# Patient Record
Sex: MALE | Race: WHITE | URBAN - METROPOLITAN AREA
[De-identification: names, ages, dates, MRNs, and addresses within clinical notes are randomized per-mention and may not be internally consistent; named-entity substitution may affect disease eponyms.]

---

## 2017-05-04 ENCOUNTER — TRANSFERRED RECORDS (OUTPATIENT)
Dept: HEALTH INFORMATION MANAGEMENT | Facility: CLINIC | Age: 62
End: 2017-05-04

## 2017-05-30 ENCOUNTER — TRANSFERRED RECORDS (OUTPATIENT)
Dept: HEALTH INFORMATION MANAGEMENT | Facility: CLINIC | Age: 62
End: 2017-05-30

## 2017-05-30 ENCOUNTER — HOSPITAL ENCOUNTER (OUTPATIENT)
Dept: GENERAL RADIOLOGY | Facility: CLINIC | Age: 62
Discharge: HOME | End: 2017-05-30
Attending: OTOLARYNGOLOGY | Admitting: OTOLARYNGOLOGY
Payer: COMMERCIAL

## 2017-05-30 DIAGNOSIS — R42 DIZZINESS: ICD-10-CM

## 2017-05-30 PROCEDURE — 70120 X-RAY EXAM OF MASTOIDS: CPT

## 2017-07-05 RX ORDER — OMEGA-3 FATTY ACIDS/FISH OIL 300-1000MG
1 CAPSULE ORAL EVERY MORNING
COMMUNITY

## 2017-07-05 RX ORDER — NEOMYCIN SULFATE, POLYMYXIN B SULFATE, HYDROCORTISONE 3.5; 10000; 1 MG/ML; [USP'U]/ML; MG/ML
4 SOLUTION/ DROPS AURICULAR (OTIC) 4 TIMES DAILY
Status: ON HOLD | COMMUNITY
End: 2017-07-10

## 2017-07-09 ENCOUNTER — ANESTHESIA EVENT (OUTPATIENT)
Dept: SURGERY | Facility: CLINIC | Age: 62
End: 2017-07-09
Payer: COMMERCIAL

## 2017-07-10 ENCOUNTER — ANESTHESIA (OUTPATIENT)
Dept: SURGERY | Facility: CLINIC | Age: 62
End: 2017-07-10
Payer: COMMERCIAL

## 2017-07-10 ENCOUNTER — HOSPITAL ENCOUNTER (OUTPATIENT)
Facility: CLINIC | Age: 62
Discharge: HOME | End: 2017-07-10
Attending: OTOLARYNGOLOGY | Admitting: OTOLARYNGOLOGY
Payer: COMMERCIAL

## 2017-07-10 VITALS
RESPIRATION RATE: 12 BRPM | WEIGHT: 222 LBS | HEIGHT: 75 IN | DIASTOLIC BLOOD PRESSURE: 69 MMHG | HEART RATE: 54 BPM | SYSTOLIC BLOOD PRESSURE: 129 MMHG | BODY MASS INDEX: 27.6 KG/M2 | OXYGEN SATURATION: 94 % | TEMPERATURE: 97.9 F

## 2017-07-10 DIAGNOSIS — Z98.890 S/P EAR SURGERY: Primary | ICD-10-CM

## 2017-07-10 LAB — POTASSIUM SERPL-SCNC: 3.1 MMOL/L (ref 3.4–5.3)

## 2017-07-10 PROCEDURE — 84132 ASSAY OF SERUM POTASSIUM: CPT | Performed by: ANESTHESIOLOGY

## 2017-07-10 PROCEDURE — 37000008 ZZH ANESTHESIA TECHNICAL FEE, 1ST 30 MIN: Performed by: OTOLARYNGOLOGY

## 2017-07-10 PROCEDURE — 36000062 ZZH SURGERY LEVEL 4 1ST 30 MIN - UMMC: Performed by: OTOLARYNGOLOGY

## 2017-07-10 PROCEDURE — 25000128 H RX IP 250 OP 636: Performed by: REGISTERED NURSE

## 2017-07-10 PROCEDURE — 27210995 ZZH RX 272: Performed by: OTOLARYNGOLOGY

## 2017-07-10 PROCEDURE — 25000128 H RX IP 250 OP 636: Performed by: ANESTHESIOLOGY

## 2017-07-10 PROCEDURE — 25000566 ZZH SEVOFLURANE, EA 15 MIN: Performed by: OTOLARYNGOLOGY

## 2017-07-10 PROCEDURE — 25000125 ZZHC RX 250: Performed by: OTOLARYNGOLOGY

## 2017-07-10 PROCEDURE — 25000128 H RX IP 250 OP 636: Performed by: OTOLARYNGOLOGY

## 2017-07-10 PROCEDURE — 40000170 ZZH STATISTIC PRE-PROCEDURE ASSESSMENT II: Performed by: OTOLARYNGOLOGY

## 2017-07-10 PROCEDURE — 36415 COLL VENOUS BLD VENIPUNCTURE: CPT | Performed by: ANESTHESIOLOGY

## 2017-07-10 PROCEDURE — 25000132 ZZH RX MED GY IP 250 OP 250 PS 637: Performed by: OTOLARYNGOLOGY

## 2017-07-10 PROCEDURE — 25000125 ZZHC RX 250: Performed by: ANESTHESIOLOGY

## 2017-07-10 PROCEDURE — C9399 UNCLASSIFIED DRUGS OR BIOLOG: HCPCS | Performed by: REGISTERED NURSE

## 2017-07-10 PROCEDURE — 27211024 ZZHC OR SUPPLY OTHER OPNP: Performed by: OTOLARYNGOLOGY

## 2017-07-10 PROCEDURE — 36000064 ZZH SURGERY LEVEL 4 EA 15 ADDTL MIN - UMMC: Performed by: OTOLARYNGOLOGY

## 2017-07-10 PROCEDURE — 25000125 ZZHC RX 250: Performed by: REGISTERED NURSE

## 2017-07-10 PROCEDURE — 71000027 ZZH RECOVERY PHASE 2 EACH 15 MINS: Performed by: OTOLARYNGOLOGY

## 2017-07-10 PROCEDURE — 71000014 ZZH RECOVERY PHASE 1 LEVEL 2 FIRST HR: Performed by: OTOLARYNGOLOGY

## 2017-07-10 PROCEDURE — 37000009 ZZH ANESTHESIA TECHNICAL FEE, EACH ADDTL 15 MIN: Performed by: OTOLARYNGOLOGY

## 2017-07-10 PROCEDURE — 27210794 ZZH OR GENERAL SUPPLY STERILE: Performed by: OTOLARYNGOLOGY

## 2017-07-10 DEVICE — SHEET SILICONE 38MMX51MMX0.13MM  20-10685: Type: IMPLANTABLE DEVICE | Site: EAR | Status: FUNCTIONAL

## 2017-07-10 RX ORDER — ALBUTEROL SULFATE 0.83 MG/ML
2.5 SOLUTION RESPIRATORY (INHALATION) ONCE
Status: COMPLETED | OUTPATIENT
Start: 2017-07-10 | End: 2017-07-10

## 2017-07-10 RX ORDER — OXYCODONE AND ACETAMINOPHEN 5; 325 MG/1; MG/1
1 TABLET ORAL EVERY 4 HOURS PRN
Qty: 40 TABLET | Refills: 0 | Status: SHIPPED | OUTPATIENT
Start: 2017-07-10

## 2017-07-10 RX ORDER — SODIUM CHLORIDE, SODIUM LACTATE, POTASSIUM CHLORIDE, AND CALCIUM CHLORIDE .6; .31; .03; .02 G/100ML; G/100ML; G/100ML; G/100ML
IRRIGANT IRRIGATION PRN
Status: DISCONTINUED | OUTPATIENT
Start: 2017-07-10 | End: 2017-07-10 | Stop reason: HOSPADM

## 2017-07-10 RX ORDER — ONDANSETRON 2 MG/ML
4 INJECTION INTRAMUSCULAR; INTRAVENOUS EVERY 30 MIN PRN
Status: DISCONTINUED | OUTPATIENT
Start: 2017-07-10 | End: 2017-07-10 | Stop reason: HOSPADM

## 2017-07-10 RX ORDER — ONDANSETRON 2 MG/ML
INJECTION INTRAMUSCULAR; INTRAVENOUS PRN
Status: DISCONTINUED | OUTPATIENT
Start: 2017-07-10 | End: 2017-07-10

## 2017-07-10 RX ORDER — SODIUM CHLORIDE, SODIUM LACTATE, POTASSIUM CHLORIDE, CALCIUM CHLORIDE 600; 310; 30; 20 MG/100ML; MG/100ML; MG/100ML; MG/100ML
INJECTION, SOLUTION INTRAVENOUS CONTINUOUS
Status: DISCONTINUED | OUTPATIENT
Start: 2017-07-10 | End: 2017-07-10 | Stop reason: HOSPADM

## 2017-07-10 RX ORDER — EPHEDRINE SULFATE 50 MG/ML
INJECTION, SOLUTION INTRAMUSCULAR; INTRAVENOUS; SUBCUTANEOUS PRN
Status: DISCONTINUED | OUTPATIENT
Start: 2017-07-10 | End: 2017-07-10

## 2017-07-10 RX ORDER — CEFAZOLIN SODIUM 1 G/3ML
1 INJECTION, POWDER, FOR SOLUTION INTRAMUSCULAR; INTRAVENOUS SEE ADMIN INSTRUCTIONS
Status: DISCONTINUED | OUTPATIENT
Start: 2017-07-10 | End: 2017-07-10 | Stop reason: HOSPADM

## 2017-07-10 RX ORDER — MEPERIDINE HYDROCHLORIDE 25 MG/ML
12.5 INJECTION INTRAMUSCULAR; INTRAVENOUS; SUBCUTANEOUS
Status: DISCONTINUED | OUTPATIENT
Start: 2017-07-10 | End: 2017-07-10 | Stop reason: HOSPADM

## 2017-07-10 RX ORDER — LIDOCAINE HYDROCHLORIDE AND EPINEPHRINE 10; 10 MG/ML; UG/ML
INJECTION, SOLUTION INFILTRATION; PERINEURAL PRN
Status: DISCONTINUED | OUTPATIENT
Start: 2017-07-10 | End: 2017-07-10 | Stop reason: HOSPADM

## 2017-07-10 RX ORDER — FENTANYL CITRATE 50 UG/ML
25-50 INJECTION, SOLUTION INTRAMUSCULAR; INTRAVENOUS
Status: DISCONTINUED | OUTPATIENT
Start: 2017-07-10 | End: 2017-07-10 | Stop reason: HOSPADM

## 2017-07-10 RX ORDER — OFLOXACIN 3 MG/ML
SOLUTION/ DROPS OPHTHALMIC PRN
Status: DISCONTINUED | OUTPATIENT
Start: 2017-07-10 | End: 2017-07-10 | Stop reason: HOSPADM

## 2017-07-10 RX ORDER — AMOXICILLIN 500 MG/1
500 CAPSULE ORAL 3 TIMES DAILY
Qty: 30 CAPSULE | Refills: 0 | Status: SHIPPED | OUTPATIENT
Start: 2017-07-10

## 2017-07-10 RX ORDER — CEFAZOLIN SODIUM 2 G/100ML
2 INJECTION, SOLUTION INTRAVENOUS
Status: COMPLETED | OUTPATIENT
Start: 2017-07-10 | End: 2017-07-10

## 2017-07-10 RX ORDER — DEXAMETHASONE SODIUM PHOSPHATE 4 MG/ML
INJECTION, SOLUTION INTRA-ARTICULAR; INTRALESIONAL; INTRAMUSCULAR; INTRAVENOUS; SOFT TISSUE PRN
Status: DISCONTINUED | OUTPATIENT
Start: 2017-07-10 | End: 2017-07-10

## 2017-07-10 RX ORDER — PROPOFOL 10 MG/ML
INJECTION, EMULSION INTRAVENOUS PRN
Status: DISCONTINUED | OUTPATIENT
Start: 2017-07-10 | End: 2017-07-10

## 2017-07-10 RX ORDER — LIDOCAINE HYDROCHLORIDE 20 MG/ML
INJECTION, SOLUTION INFILTRATION; PERINEURAL PRN
Status: DISCONTINUED | OUTPATIENT
Start: 2017-07-10 | End: 2017-07-10

## 2017-07-10 RX ORDER — EPINEPHRINE NASAL SOLUTION 1 MG/ML
SOLUTION NASAL PRN
Status: DISCONTINUED | OUTPATIENT
Start: 2017-07-10 | End: 2017-07-10 | Stop reason: HOSPADM

## 2017-07-10 RX ORDER — OXYCODONE AND ACETAMINOPHEN 5; 325 MG/1; MG/1
1 TABLET ORAL ONCE
Status: COMPLETED | OUTPATIENT
Start: 2017-07-10 | End: 2017-07-10

## 2017-07-10 RX ORDER — ONDANSETRON 4 MG/1
4 TABLET, ORALLY DISINTEGRATING ORAL EVERY 30 MIN PRN
Status: DISCONTINUED | OUTPATIENT
Start: 2017-07-10 | End: 2017-07-10 | Stop reason: HOSPADM

## 2017-07-10 RX ORDER — NALOXONE HYDROCHLORIDE 0.4 MG/ML
.1-.4 INJECTION, SOLUTION INTRAMUSCULAR; INTRAVENOUS; SUBCUTANEOUS
Status: DISCONTINUED | OUTPATIENT
Start: 2017-07-10 | End: 2017-07-10 | Stop reason: HOSPADM

## 2017-07-10 RX ORDER — FENTANYL CITRATE 50 UG/ML
INJECTION, SOLUTION INTRAMUSCULAR; INTRAVENOUS PRN
Status: DISCONTINUED | OUTPATIENT
Start: 2017-07-10 | End: 2017-07-10

## 2017-07-10 RX ADMIN — CEFAZOLIN SODIUM 2 G: 2 INJECTION, SOLUTION INTRAVENOUS at 07:52

## 2017-07-10 RX ADMIN — FENTANYL CITRATE 100 MCG: 50 INJECTION, SOLUTION INTRAMUSCULAR; INTRAVENOUS at 09:19

## 2017-07-10 RX ADMIN — PROPOFOL 200 MG: 10 INJECTION, EMULSION INTRAVENOUS at 07:42

## 2017-07-10 RX ADMIN — FENTANYL CITRATE 50 MCG: 50 INJECTION, SOLUTION INTRAMUSCULAR; INTRAVENOUS at 09:08

## 2017-07-10 RX ADMIN — SUGAMMADEX 200 MG: 100 INJECTION, SOLUTION INTRAVENOUS at 09:24

## 2017-07-10 RX ADMIN — SODIUM CHLORIDE, POTASSIUM CHLORIDE, SODIUM LACTATE AND CALCIUM CHLORIDE: 600; 310; 30; 20 INJECTION, SOLUTION INTRAVENOUS at 07:37

## 2017-07-10 RX ADMIN — FENTANYL CITRATE 50 MCG: 50 INJECTION, SOLUTION INTRAMUSCULAR; INTRAVENOUS at 08:24

## 2017-07-10 RX ADMIN — Medication 10 MG: at 08:24

## 2017-07-10 RX ADMIN — Medication 5 MG: at 08:15

## 2017-07-10 RX ADMIN — FENTANYL CITRATE 50 MCG: 50 INJECTION, SOLUTION INTRAMUSCULAR; INTRAVENOUS at 07:41

## 2017-07-10 RX ADMIN — Medication 40 MG: at 07:44

## 2017-07-10 RX ADMIN — DEXAMETHASONE SODIUM PHOSPHATE 6 MG: 4 INJECTION, SOLUTION INTRAMUSCULAR; INTRAVENOUS at 08:05

## 2017-07-10 RX ADMIN — ONDANSETRON 4 MG: 2 INJECTION INTRAMUSCULAR; INTRAVENOUS at 09:19

## 2017-07-10 RX ADMIN — MIDAZOLAM HYDROCHLORIDE 2 MG: 1 INJECTION, SOLUTION INTRAMUSCULAR; INTRAVENOUS at 07:34

## 2017-07-10 RX ADMIN — LIDOCAINE HYDROCHLORIDE 60 MG: 20 INJECTION, SOLUTION INFILTRATION; PERINEURAL at 07:42

## 2017-07-10 RX ADMIN — OXYCODONE HYDROCHLORIDE AND ACETAMINOPHEN 1 TABLET: 5; 325 TABLET ORAL at 12:00

## 2017-07-10 RX ADMIN — ALBUTEROL SULFATE 2.5 MG: 2.5 SOLUTION RESPIRATORY (INHALATION) at 12:16

## 2017-07-10 RX ADMIN — Medication 5 MG: at 08:35

## 2017-07-10 ASSESSMENT — LIFESTYLE VARIABLES: TOBACCO_USE: 1

## 2017-07-10 NOTE — OR NURSING
Pt still desaturating to the high 80's on and off.  The patient's sats come up to the 90's spontaneously.  He is doing deep breathing and Incentive Spirometer.  LS are clear bilaterally.  The patient was switched from the finger probe to the ear probe and sats remain unchanged.  The patient was positioned upright in the chair to see if that helps.  The patient states that he has had this issue in the past following a colonoscopy.  Will reassess the patient after sitting up for a few minutes to see if his sats improved.

## 2017-07-10 NOTE — IP AVS SNAPSHOT
MRN:[id number]                      After Visit Summary   7/10/2017    Omar Landon    MRN: [id number]           Thank you!     Thank you for choosing [facility] for your care. Our goal is always to provide you with excellent care. Hearing back from our patients is one way we can continue to improve our services. Please take a few minutes to complete the written survey that you may receive in the mail after you visit with us. Thank you!        Patient Information     Date Of Birth          1955        About your hospital stay     You were admitted on:  July 10, 2017 You last received care in the:   PACU    You were discharged on:  July 10, 2017       Who to Call     For medical emergencies, please call 911.  For non-urgent questions about your medical care, please call your primary care provider or clinic, [phone]  For questions related to your surgery, please call your surgery clinic        Attending Provider     Provider Renato Dugan MD Otolaryngology       Primary Care Provider Office Phone # Fax #    Cristiane Simpson [phone]      Further instructions from your care team       Remove dressing in 24 hours  Avoid heavy lifting, straining  Call with fever, headache, neck stiffness, confusion or go to ER         INFORMATION FOR PATIENTS WHO HAVE HAD EAR SURGERY  DO NOT ALLOW WATER OR MOISTURE IN OR AROUND THE EAR CANAL    OR INCISION  DO NOT WASH YOUR HAIR UNTIL AFTER YOUR FIRST VISIT FOLLOWING SURGERY unless your doctor gives you approval. Precautions must be taken to avoid any kind of water or moisture in your ear incision.   DO NOT BLOW YOUR NOSE. Avoid sneezing, if unavoidable, sneeze with your  mouth open.  SLEEP WITH YOUR HEAD PROPPED UP on two pillows for the first few nights after surgery or until the sutures are removed. This will help reduce swelling and promote drainage. Good sleep also promotes rapid healing.  Firm pillows give the  best comfort for sleeping. Some find non-rocking, overstuffed chairs provide comfort. As long as your head is above your feet, a comfortable chair can be used for sleeping.  ACTIVITY:     DO NOT LIFT ANYTHING heavier than 5-10 pounds for 1 week.     Then you may increase on a weekly basis. Example: 2nd week - 10-20 pounds; 3rd week - 20-30 pounds; 4th week - usual normal activity. STOP physical exercises such as aerobics, push-ups, sit-ups, etc, depending on the nature of the surgery performed.      Usually normal activity can be resumed after 1 month.   DO NOT STRAIN. If constipation is a problem, use a laxative.   AVOID QUICK HEAD AND BODY MOVEMENTS, THEY CAN CAUSE DIZZINESS. Some imbalance after surgery is normal, however spinning dizziness (vertigo) should be reported.   IF THE EAR IS PACKED, DO NOT REMOVE THE PACKING   CHANGE THE DRESSING OVER THE INCISION at least twice a day. Make sure you have CLEAN, DRY HANDS when changing the dressing (you will be given supplies including dressings and tape).     Use a cotton-tipped applicator to clean the incision, first with hydrogen peroxide followed by 70% alcohol solution.     Apply a thin layer of Bacitracin ointment twice daily.     Cover with the appropriate dressing. A nurse or physician will advise you on the proper dressing.   LOOK FOR SIGNS OF INFECTION if you have an incision, inspect it daily. Report increased pain, redness, swelling, or drainage to your doctor.   SOME DRAINAGE FROM YOUR EAR IS NORMAL AFTER SURGERY.  You will probably hear unusual sounds until total hearing is complete, generally 4-6 weeks after surgery.    AIRPLANE TRAVEL IS USUALLY RESTRICTED FOR 1 MONTH. Discuss  flying plans with your doctor to discuss measures to protect your ears.    CALL YOUR DOCTOR IF:     Temperature rises to 101 degrees or greater     Ear drainage increases rather than decreases     If ear drainage develops an odor  FOLLOW UP APPOINTMENT: Unless otherwise instructed  by your doctor, return to the clinic in 1 week for packing and/or suture removal. You should be given an appointment when you leave the hospital after surgery, If not, please call for an appointment at [phone]. Depending on the nature of the ear procedure, post-operative appointments may be scheduled every 2-3 weeks after surgery to insure proper healing. Our staff doctors are assisted by two associate doctors to promote quality care.    GENERAL LONG TERM INFORMATION  o Do not fly if you have an upper respiratory infection.  o Patients who have had mastoidectomy or canalplasty will need appointments every 6-12 months for ear cleanings for the remainder of their lives as their ears are no longer self cleaning as normal ears usually are.   o All patients who have had an ear surgery should come for appointments at least once yearly.       IF YOU HAVE QUESTIONS OR CONCERNS, PLEASE CALL OUR OFFICE  AT [phone] (24 hours/day, 7 days/week)             Rev. 5/2014              Same-Day Surgery   Adult Discharge Orders & Instructions     For 24 hours after surgery:  1. Get plenty of rest.  A responsible adult must stay with you for at least 24 hours after you leave the hospital.   2. Pain medication can slow your reflexes. Do not drive or use heavy equipment.  If you have weakness or tingling, don't drive or use heavy equipment until this feeling goes away.  3. Mixing alcohol and pain medication can cause dizziness and slow your breathing. It can even be fatal. Do not drink alcohol while taking pain medication.  4. Avoid strenuous or risky activities.  Ask for help when climbing stairs.   5. You may feel lightheaded.  If so, sit for a few minutes before standing.  Have someone help you get up.   6. If you have nausea (feel sick to your stomach), drink only clear liquids such as apple juice, ginger ale, broth or 7-Up.  Rest may also help.  Be sure to drink enough fluids.  Move to a regular diet as you feel able.  "Take pain medications with a small amount of solid food, such as toast or crackers, to avoid nausea.   7. A slight fever is normal. Call the doctor if your fever is over 100 F (37.7 C) (taken under the tongue) or lasts longer than 24 hours.  8. You may have a dry mouth, muscle aches, trouble sleeping or a sore throat.  These symptoms should go away after 24 hours.  9. Do not make important or legal decisions.   Pain Management:      1. Take pain medication (if prescribed) for pain as directed by your physician.        2. WARNING: If the pain medication you have been prescribed contains Tylenol  (acetaminophen), DO NOT take additional doses of Tylenol (acetaminophen).     Call your doctor for any of the followin.  Signs of infection (fever, growing tenderness at the surgery site, severe pain, a large amount of drainage or bleeding, foul-smelling drainage, redness, swelling).    2.  It has been over 8 to 10 hours since surgery and you are still not able to urinate (pee).    3.  Headache for over 24 hours.    4.  Numbness, tingling or weakness the day after surgery (if you had spinal anesthesia).  To contact a doctor, call _____________________________________ or:      [phone] and ask for the Resident On Call for:          __________________________________________ (answered 24 hours a day)      Emergency Department:  [facility]: [phone]  [facility]: [phone]               Rev. 10/2014                       Pending Results     No orders found from 2017 to 2017.            Admission Information     Date & Time Provider Department Dept. Phone    7/10/2017 Renato Ch MD  PACU [phone]      Your Vitals Were     Blood Pressure Pulse Temperature Respirations Height Weight    137/72 54 99  F (37.2  C) (Oral) 7 1.895 m (6' 2.6\") 100.7 kg (222 lb 0.1 oz)    Pulse Oximetry BMI (Body Mass Index)                93% 28.05 kg/m2          MyChart " "Information     [facility] lets you send messages to your doctor, view your test results, renew your prescriptions, schedule appointments and more. To sign up, go to [email or web address] . Click on \"Log in\" on the left side of the screen, which will take you to the Welcome page. Then click on \"Sign up Now\" on the right side of the page.     You will be asked to enter the access code listed below, as well as some personal information. Please follow the directions to create your username and password.     Your access code is: [email or web address]  Expires: 10/8/2017  9:36 AM     Your access code will  in 90 days. If you need help or a new code, please call your [facility] clinic or [phone].        Care EveryWhere ID     This is your Care EveryWhere ID. This could be used by other organizations to access your [facility] medical records  [id number]        Equal Access to Services     YUSUF STEEL : Hollis smallo Soharsha, waaxda lutuan, qaybta kaalmada ademarcela, payal ho . So [facility] [phone].    ATENCIÓN: Si habla español, tiene a mata disposición servicios gratuitos de asistencia lingüística. Llame al [phone].    We comply with applicable federal civil rights laws and [place] laws. We do not discriminate on the basis of race, color, national origin, age, disability sex, sexual orientation or gender identity.               Review of your medicines      UNREVIEWED medicines. Ask your doctor about these medicines        Dose / Directions    aspirin 81 MG tablet        Take by mouth daily   Refills:  0       ATORVASTATIN CALCIUM PO        Dose:  40 mg   Take 40 mg by mouth At Bedtime   Refills:  0       CHLORTHALIDONE PO        Dose:  25 mg   Take 25 mg by mouth every morning   Refills:  0       omega 3 1000 MG Caps        Dose:  1 capsule   Take 1 capsule by mouth every morning   Refills:  0       PAXIL PO        Dose:  30 mg   Take 30 mg by mouth every morning   Refills:  " 0       REVATIO PO        Dose:  20 mg   Take 20 mg by mouth 2-3 tablets 1 hour before intercourse   Refills:  0       TOPROL XL PO        Dose:  25 mg   Take 25 mg by mouth every morning   Refills:  0       VIAGRA PO   Indication:  Erectile Dysfunction        0.5-1 tablet as needed   Refills:  0         START taking        Dose / Directions    amoxicillin 500 MG capsule   Commonly known as:  AMOXIL   Used for:  S/P ear surgery        Dose:  500 mg   Take 1 capsule (500 mg) by mouth 3 times daily   Quantity:  30 capsule   Refills:  0       oxyCODONE-acetaminophen 5-325 MG per tablet   Commonly known as:  PERCOCET   Used for:  S/P ear surgery        Dose:  1 tablet   Take 1 tablet by mouth every 4 hours as needed for pain maximum 8 tablet(s) per day   Quantity:  40 tablet   Refills:  0            Where to get your medicines      These medications were sent to [facility] [place], [place] - [address]  [address] [facility] [address], [facility] [address]     Phone:  [phone]     amoxicillin 500 MG capsule         Some of these will need a paper prescription and others can be bought over the counter. Ask your nurse if you have questions.     Bring a paper prescription for each of these medications     oxyCODONE-acetaminophen 5-325 MG per tablet                Protect others around you: Learn how to safely use, store and throw away your medicines at www.disposemymeds.org.             Medication List: This is a list of all your medications and when to take them. Check marks below indicate your daily home schedule. Keep this list as a reference.      Medications           Morning Afternoon Evening Bedtime As Needed    amoxicillin 500 MG capsule   Commonly known as:  AMOXIL   Take 1 capsule (500 mg) by mouth 3 times daily                                aspirin 81 MG tablet   Take by mouth daily                                ATORVASTATIN CALCIUM PO   Take 40 mg by mouth At Bedtime                                 CHLORTHALIDONE PO   Take 25 mg by mouth every morning                                omega 3 1000 MG Caps   Take 1 capsule by mouth every morning                                oxyCODONE-acetaminophen 5-325 MG per tablet   Commonly known as:  PERCOCET   Take 1 tablet by mouth every 4 hours as needed for pain maximum 8 tablet(s) per day                                PAXIL PO   Take 30 mg by mouth every morning                                REVATIO PO   Take 20 mg by mouth 2-3 tablets 1 hour before intercourse                                TOPROL XL PO   Take 25 mg by mouth every morning                                VIAGRA PO   0.5-1 tablet as needed

## 2017-07-10 NOTE — ANESTHESIA CARE TRANSFER NOTE
Patient: Omar Landon    Procedure(s):  Left Endolymphatic Sac Enhancement, Sigmoid Sinus Decompression, Extended Facial Recess Approach, Complete Mastoidectomy, Myringotomy and Tube - Wound Class: I-Clean    Diagnosis: Meniere's Disease  Diagnosis Additional Information: No value filed.    Anesthesia Type:   General, ETT     Note:  Airway :Face Mask  Patient transferred to:PACU  Comments: VSS.  Spontaneous respirations.  Moving all extremities.  Alert and oriented x3.  Satisfactory anesthetic recovery.      Vitals: (Last set prior to Anesthesia Care Transfer)    CRNA VITALS  7/10/2017 0911 - 7/10/2017 0950      7/10/2017             NIBP: 138/73    Pulse: 79    NIBP Mean: 96    SpO2: 94 %    Resp Rate (observed): 11    EKG: NSR                Electronically Signed By: RAVEN Han CRNA  July 10, 2017  9:50 AM

## 2017-07-10 NOTE — IP AVS SNAPSHOT
[facility]    [address] [address]    Phone:  [phone]                                       After Visit Summary   7/10/2017    Omar Landon    MRN: [id number]           After Visit Summary Signature Page     I have received my discharge instructions, and my questions have been answered. I have discussed any challenges I see with this plan with the nurse or doctor.    ..........................................................................................................................................  Patient/Patient Representative Signature      ..........................................................................................................................................  Patient Representative Print Name and Relationship to Patient    ..................................................               ................................................  Date                                            Time    ..........................................................................................................................................  Reviewed by Signature/Title    ...................................................              ..............................................  Date                                                            Time

## 2017-07-10 NOTE — DISCHARGE SUMMARY
Physician Discharge Summary     Patient ID:  Omar Landon  [id number]  61 year old  1955    Admit date: 7/10/2017    Discharge date and time: 7/10/2017     Admitting Physician: Renato Ch MD     Discharge Physician: Miles Ibrahim MD    Admission Diagnoses: Meniere's Disease    Discharge Diagnoses: Same    Admission Condition: good    Discharged Condition: good    Indication for Admission: Meniere disease    Hospital Course: Patient underwent surgery as planned.    Consults: none    Significant Diagnostic Studies: None    Treatments: surgery: See op note    Discharge Exam:  None    Disposition: home    Patient Instructions:   Current Discharge Medication List      START taking these medications    Details   oxyCODONE-acetaminophen (PERCOCET) 5-325 MG per tablet Take 1 tablet by mouth every 4 hours as needed for pain maximum 8 tablet(s) per day  Qty: 40 tablet, Refills: 0    Associated Diagnoses: S/P ear surgery      amoxicillin (AMOXIL) 500 MG capsule Take 1 capsule (500 mg) by mouth 3 times daily  Qty: 30 capsule, Refills: 0    Associated Diagnoses: S/P ear surgery         CONTINUE these medications which have NOT CHANGED    Details   ATORVASTATIN CALCIUM PO Take 40 mg by mouth At Bedtime       CHLORTHALIDONE PO Take 25 mg by mouth every morning       Metoprolol Succinate (TOPROL XL PO) Take 25 mg by mouth every morning       omega 3 1000 MG CAPS Take 1 capsule by mouth every morning       PARoxetine HCl (PAXIL PO) Take 30 mg by mouth every morning       Sildenafil Citrate (REVATIO PO) Take 20 mg by mouth 2-3 tablets 1 hour before intercourse      Sildenafil Citrate (VIAGRA PO) 0.5-1 tablet as needed      aspirin 81 MG tablet Take by mouth daily           Activity: no lifting, Driving, or Strenuous exercise for until seen in clinic and cleared to resume  Diet: regular diet  Wound Care: keep wound clean and dry    Follow-up with Dr. Ch in 1 week.    Signed:  Miles  [place]  7/10/2017  9:36 AM

## 2017-07-10 NOTE — OP NOTE
PREOPERATIVE DIAGNOSIS:  LEFT Meniere's disease.       POSTOPERATIVE DIAGNOSIS:  Same.      PROCEDURE:     Left endolymphatic sac enhancement.  Complete mastoidectomy.  Extended facial recess approach.  Sigmoid sinus decompression.  Removal of dermis. Tympanostomy with #1 Paparella tube insertion.       FINDINGS:   Reduced pneumatization  Deep anterior sigmoid sinus  Very small Trautmann's triangle.  Stiff dura  Inferior dura opened and aracnoid exposed  Good decompression   Paparella #1 tube inserted  Very thick dermis      SURGEON:  Renato Ch MD       ASSISTANT:  MD Miles Erazo MD      ANESTHESIA:  General endotracheal.       ESTIMATED BLOOD LOSS:  25 mL.       COMPLICATIONS:  None.       CONDITION:  Stable to PACU.       INDICATION FOR PROCEDURE:  Pleasant 62 y/o gentleman with left ear meniere disease. He has tried medical therapy.  Full history and physical examination, workup, imaging data, audiological data is performed on the patient, which confirms signs of Meniere's.  Risks include but are not limited to bleeding, infection, scarring, damage to any surrounding structures, need for other procedures, risk of anesthesia including death, spinal fluid leak, facial nerve paralysis, worsening hearing, vertigo and numbness to the ear.       OPERATIVE NARRATIVE:  .  A full timeout and IV antibiotics are performed prior to the start of the case.  The patient was prepped and draped in the usual sterile fashion.  Lidocaine 1% with 1:100,000 epinephrine is injected into the patient's postauricular region in the left ear. The microscope was used for the entire case for visualization.  Once the injection was left in place for about 5 minutes then a #15 blade was used to make a curvilinear incision about 1 cm away from the postauricular crease.  This was carried down to the level of the areolar fascia.  Then, a Bovie cautery was used for hemostasis and continued dissection  anteriorly towards the patient's ear canal.  The dermis was very thick and a large portion was excised. Once the ear canal was reached, a subperiosteal incision was made along the linea temporalis and down to the mastoid tip and then a Lempert elevator was used to remove the soft tissue in this region, exposing the mastoid bone in a subperiosteal manner. Then a complete mastoidectomy was performed in a saucerization manner  with a series of drill burrs.  The complete mastoidectomy was then performed in an extended facial recess approach to permit additional ventilation. The sigmoid sinus was identified along with the horizontal canal and tegmen as well as the facial nerve.  The sigmoid sinus was decompressed by removing the bone in this region.   A duckbill elevator was used to elevate the bone off of the sigmoid sinus.  Then there was a bony shelf to get down to the dura and the bony shelf was trimmed down using a cutting bur and then a duckbill elevator was used to elevate the dura in this region.  A curet was used to remove the bone exposing the Trautmann triangle.  Mastoid was reduced in pneumatization and the sigmoid was anterior and deep.  The Trautmann's triangle was very small.  The dura was opened inferiorly.  Spacers were used for good decompression using a cup forceps and a duckbill elevator. Arachnoid was visualized.  There was no CSF leak after spacer placement.  Once the decompression was performed Gelfoam was placed in this region.  It is noted that the decompression was satisfactory.  After Gelfoam was placed, then the incision was closed in a multilayer fashion approximating the fascia and the subcutaneous layer using 4-0 Vicryl and the dermal layer using 4-0 vicryl. The skin was closed with staple clips.  Then, the ear canal was examined along with the tympanic membrane and initially the ear canal was cleaned using a Antonio suction.  A sickle knife was used to make an incision anteriorly and a #1  Paparella tube was inserted into this incision. The tube is noted to be firmly placed on the tympanic membrane and communicating between the middle ear and the external ear.  The patient tolerated this procedure well.   All scrub counts, instrument counts were correct at the end of the case.       Dr. Ch was present and scrubbed for the pertinent portions of the case.  Appropriate dressings were placed on the patient.  The patient was then given back to the Department of Anesthesia who extubated the patient without any problems.  The patient was brought back to PACU in stable condition.

## 2017-07-10 NOTE — OR NURSING
Pt desaturating into the high 80's on RA.  Pt encourage and to cough and deep breath.  Pt does come up to the 90's quickly with coughing and deep breathing, but then sats drop again to the high 80's on and off.  The patient and his wife were taught how to do the Incentive Spirometer.  The patient demonstrated how to do the IS and the wife expressed understanding.  The patient's sats were 93% pre-op and he is a current smoker.  Will continue to encourage deep breathing and Incentive Spirometer and monitor closely.

## 2017-07-10 NOTE — ANESTHESIA PREPROCEDURE EVALUATION
Anesthesia Evaluation     . Pt has had prior anesthetic. Type: General           ROS/MED HX    ENT/Pulmonary: Comment: H/o Meniere's Disease    (+)tobacco use, Current use 0.25 ppd x 20 yrs packs/day  Intermittent asthma , . .    Neurologic:       Cardiovascular:     (+) Dyslipidemia, hypertension----. : . . . :. .       METS/Exercise Tolerance:     Hematologic:         Musculoskeletal:         GI/Hepatic:         Renal/Genitourinary:         Endo:         Psychiatric:     (+) psychiatric history anxiety      Infectious Disease:         Malignancy:         Other:                     Physical Exam  Normal systems: cardiovascular, pulmonary and dental    Airway   Mallampati: I  TM distance: >3 FB  Neck ROM: full    Dental     Cardiovascular       Pulmonary     Other findings: Wears hearing aids                Anesthesia Plan      History & Physical Review  History and physical reviewed and following examination; no interval change.    ASA Status:  2 .    NPO Status:  > 8 hours    Plan for General and ETT with Propofol induction.          Postoperative Care  Postoperative pain management:  IV analgesics.      Consents  Anesthetic plan, risks, benefits and alternatives discussed with:  Patient..                  Pt took am Metoprolol.      Lab           Na    140         K      3.6         Cr     1.0        Plan - GETA      Risks and possible complications of GETA discussed in full with the patient.  He voices understanding and wishes to proceed.    Dr. Aishwarya Vargas MD  Anesthesia  07/10/2017 @ 0643

## 2017-07-10 NOTE — DISCHARGE INSTRUCTIONS
Remove dressing in 24 hours  Avoid heavy lifting, straining  Call with fever, headache, neck stiffness, confusion or go to ER         INFORMATION FOR PATIENTS WHO HAVE HAD EAR SURGERY  DO NOT ALLOW WATER OR MOISTURE IN OR AROUND THE EAR CANAL    OR INCISION  DO NOT WASH YOUR HAIR UNTIL AFTER YOUR FIRST VISIT FOLLOWING SURGERY unless your doctor gives you approval. Precautions must be taken to avoid any kind of water or moisture in your ear incision.   DO NOT BLOW YOUR NOSE. Avoid sneezing, if unavoidable, sneeze with your  mouth open.  SLEEP WITH YOUR HEAD PROPPED UP on two pillows for the first few nights after surgery or until the sutures are removed. This will help reduce swelling and promote drainage. Good sleep also promotes rapid healing.  Firm pillows give the best comfort for sleeping. Some find non-rocking, overstuffed chairs provide comfort. As long as your head is above your feet, a comfortable chair can be used for sleeping.  ACTIVITY:     DO NOT LIFT ANYTHING heavier than 5-10 pounds for 1 week.     Then you may increase on a weekly basis. Example: 2nd week - 10-20 pounds; 3rd week - 20-30 pounds; 4th week - usual normal activity. STOP physical exercises such as aerobics, push-ups, sit-ups, etc, depending on the nature of the surgery performed.      Usually normal activity can be resumed after 1 month.   DO NOT STRAIN. If constipation is a problem, use a laxative.   AVOID QUICK HEAD AND BODY MOVEMENTS, THEY CAN CAUSE DIZZINESS. Some imbalance after surgery is normal, however spinning dizziness (vertigo) should be reported.   IF THE EAR IS PACKED, DO NOT REMOVE THE PACKING   CHANGE THE DRESSING OVER THE INCISION at least twice a day. Make sure you have CLEAN, DRY HANDS when changing the dressing (you will be given supplies including dressings and tape).     Use a cotton-tipped applicator to clean the incision, first with hydrogen peroxide followed by 70% alcohol solution.     Apply a thin layer of  Bacitracin ointment twice daily.     Cover with the appropriate dressing. A nurse or physician will advise you on the proper dressing.   LOOK FOR SIGNS OF INFECTION if you have an incision, inspect it daily. Report increased pain, redness, swelling, or drainage to your doctor.   SOME DRAINAGE FROM YOUR EAR IS NORMAL AFTER SURGERY.  You will probably hear unusual sounds until total hearing is complete, generally 4-6 weeks after surgery.    AIRPLANE TRAVEL IS USUALLY RESTRICTED FOR 1 MONTH. Discuss  flying plans with your doctor to discuss measures to protect your ears.    CALL YOUR DOCTOR IF:     Temperature rises to 101 degrees or greater     Ear drainage increases rather than decreases     If ear drainage develops an odor  FOLLOW UP APPOINTMENT: Unless otherwise instructed by your doctor, return to the clinic in 1 week for packing and/or suture removal. You should be given an appointment when you leave the hospital after surgery, If not, please call for an appointment at [phone]. Depending on the nature of the ear procedure, post-operative appointments may be scheduled every 2-3 weeks after surgery to insure proper healing. Our staff doctors are assisted by two associate doctors to promote quality care.    GENERAL LONG TERM INFORMATION  o Do not fly if you have an upper respiratory infection.  o Patients who have had mastoidectomy or canalplasty will need appointments every 6-12 months for ear cleanings for the remainder of their lives as their ears are no longer self cleaning as normal ears usually are.   o All patients who have had an ear surgery should come for appointments at least once yearly.       IF YOU HAVE QUESTIONS OR CONCERNS, PLEASE CALL OUR OFFICE  AT [phone] (24 hours/day, 7 days/week)             Rev. 5/2014              Same-Day Surgery   Adult Discharge Orders & Instructions     For 24 hours after surgery:  1. Get plenty of rest.  A responsible adult must stay with you for at least  24 hours after you leave the hospital.   2. Pain medication can slow your reflexes. Do not drive or use heavy equipment.  If you have weakness or tingling, don't drive or use heavy equipment until this feeling goes away.  3. Mixing alcohol and pain medication can cause dizziness and slow your breathing. It can even be fatal. Do not drink alcohol while taking pain medication.  4. Avoid strenuous or risky activities.  Ask for help when climbing stairs.   5. You may feel lightheaded.  If so, sit for a few minutes before standing.  Have someone help you get up.   6. If you have nausea (feel sick to your stomach), drink only clear liquids such as apple juice, ginger ale, broth or 7-Up.  Rest may also help.  Be sure to drink enough fluids.  Move to a regular diet as you feel able. Take pain medications with a small amount of solid food, such as toast or crackers, to avoid nausea.   7. A slight fever is normal. Call the doctor if your fever is over 100 F (37.7 C) (taken under the tongue) or lasts longer than 24 hours.  8. You may have a dry mouth, muscle aches, trouble sleeping or a sore throat.  These symptoms should go away after 24 hours.  9. Do not make important or legal decisions.   Pain Management:      1. Take pain medication (if prescribed) for pain as directed by your physician.        2. WARNING: If the pain medication you have been prescribed contains Tylenol  (acetaminophen), DO NOT take additional doses of Tylenol (acetaminophen).     Call your doctor for any of the followin.  Signs of infection (fever, growing tenderness at the surgery site, severe pain, a large amount of drainage or bleeding, foul-smelling drainage, redness, swelling).    2.  It has been over 8 to 10 hours since surgery and you are still not able to urinate (pee).    3.  Headache for over 24 hours.    4.  Numbness, tingling or weakness the day after surgery (if you had spinal anesthesia).  To contact a doctor, call  _____________________________________ or:      [phone] and ask for the Resident On Call for:          __________________________________________ (answered 24 hours a day)      Emergency Department:  [facility]: [phone]  [facility]: [phone]               Rev. 10/2014

## 2017-07-10 NOTE — OR NURSING
Dr. Vargas notifed of patient continue to have lower sats at times in Phase II.  Dr. Vargas ordered an albuterol neb which was given and came by to see the patient.  Pt got up and walked around the unit of few times as well.  Lung sounds are clear bilaterally and the patient has good lung sounds in the lower lobes (they're not diminished).  Dr. Vargas was ok with the patient going home after evaluating him post neb.  The patient's sats stayed between 92-96 after the neb was given.  The patient told to continue IS at home every hour until he's back to his previous level of functioning.  The patient and his wife expressed understanding.

## 2017-07-10 NOTE — ANESTHESIA POSTPROCEDURE EVALUATION
Patient: Omar Landon    Procedure(s):  Left Endolymphatic Sac Enhancement, Sigmoid Sinus Decompression, Extended Facial Recess Approach, Complete Mastoidectomy, Myringotomy and Tube - Wound Class: I-Clean    Diagnosis:Meniere's Disease  Diagnosis Additional Information: No value filed.    Anesthesia Type:  General, ETT    Note:  Anesthesia Post Evaluation    Patient location during evaluation: PACU  Patient participation: Able to fully participate in evaluation  Level of consciousness: awake and alert  Pain management: adequate  Airway patency: patent  Cardiovascular status: acceptable  Respiratory status: acceptable  Hydration status: acceptable  PONV: none     Anesthetic complications: None    Comments: Seen by me at bedside.  Fully awake and alert, tolerating po and conversing.  In NAD, VSS.  Will discharge soon.    Dr. Aishwayra Vargas MD  Anesthesia  07/10/2017 @ 1029 am        Last vitals:  Vitals:    07/10/17 0948 07/10/17 1000 07/10/17 1015   BP: 132/74 137/72 120/63   Pulse:      Resp:  (!) 7 13   Temp:      SpO2: 93% 93% 93%         Electronically Signed By: Aishwarya Vargas MD  July 10, 2017  10:28 AM

## 2017-07-10 NOTE — BRIEF OP NOTE
Left Endolymphatic sac enhancement +complete mastoidectomy+ extended facial recess approach+Simoid Sinus decompression+Myringotomy+PE tube insertion Jing lorenzana 1 tube.

## (undated) DEVICE — SUCTION MANIFOLD DORNOCH ULTRA CART UL-CL500

## (undated) DEVICE — BONE WAX 2.5GM W31G

## (undated) DEVICE — SOL NACL 0.9% IRRIG 1000ML BOTTLE 2F7124

## (undated) DEVICE — SOL WATER IRRIG 1000ML BOTTLE 2F7114

## (undated) DEVICE — BLADE CLIPPER SGL USE 9680

## (undated) DEVICE — SOL RINGERS LACTATED 250ML BAG 2B2322Q

## (undated) DEVICE — PAD ARMBOARD FOAM EGGCRATE COVIDEN 3114367

## (undated) DEVICE — Device

## (undated) DEVICE — BNDG KLING 3" 2232

## (undated) DEVICE — STRAP KNEE/BODY 31143004

## (undated) DEVICE — DRAPE MICRO ZEISS MD 62"X40.5" OPMI 6 09-MK904

## (undated) DEVICE — TUBE EAR PAPARELLA TYPE 1 70241380

## (undated) DEVICE — GLOVE PROTEXIS W/NEU-THERA 7.5  2D73TE75

## (undated) DEVICE — DECANTER TRANSFER DEVICE 2008S

## (undated) DEVICE — SYR EAR BULB 3OZ 0035830

## (undated) DEVICE — DRSG ADAPTIC 3X8" 6113

## (undated) DEVICE — LINEN TOWEL PACK X5 5464

## (undated) DEVICE — DRSG KERLIX FLUFFS X5

## (undated) DEVICE — DRSG ABDOMINAL PAD UNSTERILE 8X10" WND152764B

## (undated) DEVICE — ESU GROUND PAD UNIVERSAL W/O CORD

## (undated) DEVICE — BLADE KNIFE SURG 11 371111

## (undated) DEVICE — SPONGE SURGIFOAM 100 1974

## (undated) DEVICE — SU VICRYL 3-0 PS-1 18" UND J683G

## (undated) DEVICE — STPL SKIN 35W APPOSE 8886803712

## (undated) RX ORDER — OFLOXACIN 3 MG/ML
SOLUTION AURICULAR (OTIC)
Status: DISPENSED
Start: 2017-07-10

## (undated) RX ORDER — EPHEDRINE SULFATE 50 MG/ML
INJECTION, SOLUTION INTRAMUSCULAR; INTRAVENOUS; SUBCUTANEOUS
Status: DISPENSED
Start: 2017-07-10

## (undated) RX ORDER — FENTANYL CITRATE 50 UG/ML
INJECTION, SOLUTION INTRAMUSCULAR; INTRAVENOUS
Status: DISPENSED
Start: 2017-07-10

## (undated) RX ORDER — ALBUTEROL SULFATE 90 UG/1
AEROSOL, METERED RESPIRATORY (INHALATION)
Status: DISPENSED
Start: 2017-07-10

## (undated) RX ORDER — CEFAZOLIN SODIUM 2 G/100ML
INJECTION, SOLUTION INTRAVENOUS
Status: DISPENSED
Start: 2017-07-10

## (undated) RX ORDER — BACITRACIN ZINC 500 [USP'U]/G
OINTMENT TOPICAL
Status: DISPENSED
Start: 2017-07-10

## (undated) RX ORDER — DEXAMETHASONE SODIUM PHOSPHATE 4 MG/ML
INJECTION, SOLUTION INTRA-ARTICULAR; INTRALESIONAL; INTRAMUSCULAR; INTRAVENOUS; SOFT TISSUE
Status: DISPENSED
Start: 2017-07-10

## (undated) RX ORDER — PROPOFOL 10 MG/ML
INJECTION, EMULSION INTRAVENOUS
Status: DISPENSED
Start: 2017-07-10

## (undated) RX ORDER — ALBUTEROL SULFATE 0.83 MG/ML
SOLUTION RESPIRATORY (INHALATION)
Status: DISPENSED
Start: 2017-07-10

## (undated) RX ORDER — ONDANSETRON 2 MG/ML
INJECTION INTRAMUSCULAR; INTRAVENOUS
Status: DISPENSED
Start: 2017-07-10

## (undated) RX ORDER — OXYCODONE AND ACETAMINOPHEN 5; 325 MG/1; MG/1
TABLET ORAL
Status: DISPENSED
Start: 2017-07-10